# Patient Record
Sex: FEMALE | Race: WHITE
[De-identification: names, ages, dates, MRNs, and addresses within clinical notes are randomized per-mention and may not be internally consistent; named-entity substitution may affect disease eponyms.]

---

## 2018-01-15 ENCOUNTER — HOSPITAL ENCOUNTER (OUTPATIENT)
Dept: HOSPITAL 39 - YCFC.O | Age: 58
Discharge: HOME | End: 2018-01-15
Attending: NURSE PRACTITIONER
Payer: COMMERCIAL

## 2018-01-15 DIAGNOSIS — R50.9: Primary | ICD-10-CM

## 2019-04-11 ENCOUNTER — HOSPITAL ENCOUNTER (OUTPATIENT)
Dept: HOSPITAL 39 - YCFC.O | Age: 59
End: 2019-04-11
Attending: NURSE PRACTITIONER
Payer: COMMERCIAL

## 2019-04-11 DIAGNOSIS — Z00.00: ICD-10-CM

## 2019-04-11 DIAGNOSIS — Z12.31: Primary | ICD-10-CM

## 2019-04-11 DIAGNOSIS — R53.83: ICD-10-CM

## 2019-04-11 DIAGNOSIS — R00.2: ICD-10-CM

## 2019-04-11 NOTE — MAM
EXAM DESCRIPTION: 

3D Screening BILATERAL : Digital Mammography.



CLINICAL HISTORY: 

58 years Female DIGITAL BILATERAL SCREENING . No complaints or

personal history of breast cancer. Remote family history of

breast cancer. Childbirth. Hysterectomy 40 years ago. No HRT. 

Lifetime risk of developing breast cancer (Tyrer-Cuzick

model)(%):  6.1.



COMPARISON: 

Diagnostic bilateral digital mammography 2/22/2012 along with

right targeted breast ultrasound.   



TECHNIQUE: 

Bilateral CC and MLO projection full-field images, digital

tomosynthesis mammographic technique. Bilateral digital 2-D

full-field MLO images.  CAD not available for tomosynthesis or

2-D images.  



FINDINGS: 

The breast parenchymal density pattern is: Scattered areas of

fibroglandular density. No skin thickening or nipple retraction. 

Scattered bilateral solitary microcalcifications and coarse

calcifications. Intramammary lymph node inferior left breast and

prominent left axillary node. Bilateral fibroglandular tissues

are less dense and smaller since the prior study.

No new focal, stellate mass or density, focal asymmetry , and no

suspicious microcalcifications bilaterally.  



IMPRESSION: 

Benign exam.



BIRAD CATEGORY: 2 BENIGN FINDINGS.



RECOMMENDATIONS:

FOLLOW UP: Routine digital bilateral  mammographic screening, one

year interval from  April 2019.



Written communication explaining the IMPRESSION and follow-up,

will be mailed to the patient and referring health care provider.





According to the [employer], yearly mammograms

are recommended starting at age 40 and continuing as long as a

woman is in good health.  Any breast change noted on a breast

self-exam should be reported promptly to the patient's healthcare

provider.  Breast MRI is recommended for women with an

approximately 20-25% or greater lifetime risk of breast cancer,

including women with a strong family history of breast or ovarian

cancer and women who have been treated for Hodgkin's disease.



A negative mammographic report should not delay tissue diagnosis

in patients with significant clinical history or physical

findings.



Extremely dense breast tissue limits the sensitivity of digital

mammography. 



Electronically signed by:  Carmine Guzman MD  4/11/2019 3:08 PM CDT

Workstation: [id number]

## 2020-02-18 ENCOUNTER — HOSPITAL ENCOUNTER (OUTPATIENT)
Dept: HOSPITAL 39 - LAB.O | Age: 60
End: 2020-02-18
Attending: FAMILY MEDICINE
Payer: COMMERCIAL

## 2020-02-18 DIAGNOSIS — L03.012: Primary | ICD-10-CM
